# Patient Record
Sex: MALE | Race: WHITE | NOT HISPANIC OR LATINO | Employment: UNEMPLOYED | URBAN - METROPOLITAN AREA
[De-identification: names, ages, dates, MRNs, and addresses within clinical notes are randomized per-mention and may not be internally consistent; named-entity substitution may affect disease eponyms.]

---
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
## 2022-02-02 ENCOUNTER — HOSPITAL ENCOUNTER (EMERGENCY)
Facility: CLINIC | Age: 22
Discharge: HOME | End: 2022-02-02
Attending: EMERGENCY MEDICINE | Admitting: EMERGENCY MEDICINE
Payer: OTHER GOVERNMENT

## 2022-02-02 VITALS
WEIGHT: 123 LBS | HEART RATE: 81 BPM | RESPIRATION RATE: 18 BRPM | OXYGEN SATURATION: 97 % | SYSTOLIC BLOOD PRESSURE: 120 MMHG | DIASTOLIC BLOOD PRESSURE: 57 MMHG | HEIGHT: 69 IN | BODY MASS INDEX: 18.22 KG/M2 | TEMPERATURE: 98.4 F

## 2022-02-02 DIAGNOSIS — F41.9 ANXIETY: ICD-10-CM

## 2022-02-02 DIAGNOSIS — U07.1 INFECTION DUE TO 2019 NOVEL CORONAVIRUS: ICD-10-CM

## 2022-02-02 DIAGNOSIS — F32.A DEPRESSION, UNSPECIFIED DEPRESSION TYPE: ICD-10-CM

## 2022-02-02 LAB — SARS-COV-2 RNA RESP QL NAA+PROBE: POSITIVE

## 2022-02-02 PROCEDURE — 87635 SARS-COV-2 COVID-19 AMP PRB: CPT | Performed by: EMERGENCY MEDICINE

## 2022-02-02 PROCEDURE — 99285 EMERGENCY DEPT VISIT HI MDM: CPT | Mod: 25

## 2022-02-02 PROCEDURE — C9803 HOPD COVID-19 SPEC COLLECT: HCPCS

## 2022-02-02 ASSESSMENT — ENCOUNTER SYMPTOMS
RHINORRHEA: 0
FEVER: 0
NERVOUS/ANXIOUS: 1
COUGH: 0
ABDOMINAL PAIN: 0

## 2022-02-02 ASSESSMENT — MIFFLIN-ST. JEOR: SCORE: 1563.3

## 2022-02-02 NOTE — LETTER
February 2, 2022      To Whom It May Concern:      Timothy Oconnor was seen in our Emergency Department today, 02/02/22.  I expect his condition to improve over the next 3 days.  He may return to work when improved.    Sincerely,        Bull Das MD

## 2022-02-02 NOTE — ED NOTES
Writer called [facility] to inform that pt needs assessment.  Pt resting, denies any needs at this time.  Dinner ordered

## 2022-02-02 NOTE — DISCHARGE INSTRUCTIONS
Aftercare Plan  If I am feeling unsafe or I am in a crisis, I will:   Contact my established care providers   Call the National Suicide Prevention Lifeline: [phone]   Go to the nearest emergency room   Call [phone]     Warning signs that I or other people might notice when a crisis is developing for me: I start to twitch, I start to shake, I can't stop racing thoughts, hitting my head or punching walls    Things I am able to do on my own to cope or help me feel better: go for a walk, take a break, take a shower, listen to music, write in a journal (see coping skills list below)     Things that I am able to do with others to cope or help me better: talk to my girlfriend, spend time with friends and family      Things I can use or do for distraction: watch TV/movies, listen to music, go for a walk, take a shower      Changes I can make to support my mental health and wellness:   - Get a minimum of 30 minutes of self-care daily. This can be as simply as taking a shower, going for a walk, cooking a meal, read, writing, etc.   - Exercise 3-4 times weekly   - Start a gratitude journal and write down 3 things every day that you are grateful for. There are also gratitude journal apps for your smartphone that you can download for free if using your phone.   - Download a meditation pinky and spend 15-20 minutes per day mediating/relaxing. Some apps to download include: Calm, Headspace and Insight Timer. All 3 of these apps have a free version.        People in my life that I can ask for help: my girlfriend, Dad, Mom, friend     Your Critical access hospital has a mental health crisis team you can call 24/7: [facility] Mobile Crisis  [phone] (adults)  [phone] (children), [facility] Crisis Line Number: [phone], [facility] Crisis Line Number: [phone]     Other things that are important when I m in crisis: Remember help is available, follow up with established providers, attend all appointments, take medications as  prescribed        Behavioral Healthcare Providers (BHP) Coordinators to follow up with you with 1-2 days to ensure you have all of the resources that you need. You may also call Behavioral Healthcare Providers (BHP) Coordinators at [phone] if you have any questions or if any additional resources are needed and one of our coordinators can assist you.      Additional resources and information: Please see the appointment information below:    This is a reminder that an appointment for behavioral health services has been scheduled for you. Please   contact your insurance company directly to verify coverage, eligibility, payment, and benefit information.    Date: 2/7/2022  Time: 9:00 AM  Location: [email or web address]  Federico Sampson  [facility] [address]  [place] [address] [phone]    * For directions and/or questions regarding the appointment, please contact the clinic or provider directly   at the phone number listed above.    This is a reminder that an appointment for behavioral health services has been scheduled for you. Please   contact your insurance company directly to verify coverage, eligibility, payment, and benefit information.    Date: 2/8/2022  Time: 11:00 AM  Location: [facility]  Belkis Padron CNP,PMCALLIEP,RN  [address] [address] [phone]    * For directions and/or questions regarding the appointment, please contact the clinic or provider directly   at the phone number listed above.    Progressive Muscle Relaxation Steps:    Find a quiet place free from distractions. Lie on the floor or recline in a chair, loosen any tight clothing, and remove glasses or contacts. Rest your hands in your lap or on the arms of the chair. Take a few slow even breaths. If you have not already, spend a few minutes practicing diaphragmatic breathing.    Now, focus your attention on the following areas, being careful to leave the rest of your body  relaxed.    Forehead: Squeeze the muscles in your forehead, holding for 15 seconds. Feel the muscles becoming tighter and tenser. Then, slowly release the tension in your forehead while counting for 30 seconds. Notice the difference in how your muscles feel as you relax. Continue to release the tension until your forehead feels completely relaxed. Breathe slowly and evenly.    Jaw: Tense the muscles in your jaw, holding for 15 seconds. Then release the tension slowly while counting for 30 seconds. Notice the feeling of relaxation and continue to breathe slowly and evenly.  Neck and shoulders: Increase tension in your neck and shoulders by raising your shoulders up toward your ears and hold for 15 seconds. Slowly release the tension as you count for 30 seconds. Notice the tension melting away.    Arms and hands: Slowly draw both hands into fists. Pull your fists into your chest and hold for 15 seconds, squeezing as tight as you can. Then slowly release while you count for 30 seconds. Notice the feeling of relaxation.    Buttocks: Slowly increase tension in your buttocks over 15 seconds. Then, slowly release the tension over 30 seconds. Notice the tension melting away. Continue to breathe slowly and evenly.  Legs: Slowly increase the tension in your quadriceps and calves over 15 seconds. Squeeze the muscles as hard as you can. Then gently release the tension over 30 seconds. Notice the tension melting away and the feeling of relaxation that is left.    Feet: Slowly increase the tension in your feet and toes. Tighten the muscles as much as you can. Then slowly release the tension while you count for 30 seconds. Notice all the tension melting away. Continue breathing slowly and evenly.    Enjoy the feeling of relaxation sweeping through your body. Continue to breathe slowly and evenly.    Positive Coping Skills: Here's a list of coping skills that will help you when you are feeling strong emotions such as anger,  "anxiety, or depression. These activities are not likely to create more stress or problems, so these help you be more resilient and stress tolerant.    Diversions     Write, draw, paint, photography      Play an instrument, sing, dance, act      Take a shower or a bath      Garden      Take a walk, or go for a drive      Watch television or a movie      Watch cute kitten videos on YouTube      Play a game      Go shopping      Clean or organize your environment      Read      Take a break or vacation     Social/Interpersonal (with others)     Talk to someone you trust      Set boundaries and say \"no\"      Write a note to someone you care about      Be assertive      Use humor      Spend time with friends and/or family      Serve someone in need      Care for or play with a pet      Role-play challenging situations with others      Encourage others     Cognitive (Of the Mind)     Make a gratitude list      Brainstorm solutions      Lower your expectations of the situation      Keep an inspirational quote with you      Be flexible      Write a list of goals      Take a class      Act opposite of negative feelings      Write a list of pros and cons for decisions      Reward or pamper yourself when successful      Write a list of strengths      Accept a challenge with a positive attitude     Tension Releasers     Exercise or play sports      Catharsis (yelling in the bathroom, punching a punching bag)      Cry      Laugh     Physical     Get enough sleep      Eat healthy foods      Get into a good routine      Eat a little chocolate      Limit caffeine      Deep/slow breathing     Spiritual     Waterfall or meditate      Enjoy nature      Get involved in a worthy cause     Limit Setting     Drop some involvement      Prioritize important tasks      Use assertive communication      Schedule time for yourself     Treating Anxiety Disorders with Medicine  An anxiety disorder can make you feel nervous or apprehensive, even " without a clear reason. In people age 65 and older, generalized anxiety disorder is one of the most commonly diagnosed anxiety disorders. Many times it occurs with depression. Certain anxiety disorders can cause intense feelings of fear or panic. You may even have physical symptoms such as a racing heartbeat, sweating, or dizziness. If you have these feelings, you don t have to suffer anymore. Treatment to help you overcome your fears will likely include therapy (also called counseling). Medicine may also be prescribed to help control your symptoms.     Medicines  Certain medicines may be prescribed to help control your symptoms. So you may feel less anxious. You may also feel able to move forward with therapy. At first, medicines and dosages may need to be adjusted to find what works best for you. Try to be patient. Tell your healthcare provider how a medicine makes you feel. This way, you can work together to find the treatment that s best for you. Keep in mind that medicines can have side effects. Talk with your provider about any side effects that are bothering you. Changing the dose or type of medicine may help. Don t stop taking medicine on your own. That can cause symptoms to come back or cause dangerous withdrawal symptoms.     Anti-anxiety medicine. This medicine eases symptoms and helps you relax. Your healthcare provider will explain when and how to use it. It may be prescribed for use before situations that make you anxious. You may also be told to take medicine on a regular schedule. Anti-anxiety medicine may make you feel a little sleepy or  out of it.  Don t drive a car or operate machinery while on this medicine, until you know how it affects you.  Never use alcohol or other drugs with anti-anxiety medicines. This could result in loss of muscular control, sedation, coma, or death. Also, use only the amount of medicine prescribed for you. If you think you may have taken too much, get emergency care  right away. Never share your medicines with others. Store these medicines in a safe place that can't be reached by children or visitors.   Keep taking medicines as prescribed  Never change your dosage, share or use another person's medicine, or stop taking your medicines without talking to your healthcare provider first. Keep the following in mind:     Some medicines must be taken on a schedule. Make this part of your daily routine. For instance, always take your pill before brushing your teeth. A pillbox can help you remember if you ve taken your medicine each day.    Medicines are often taken for 6 to 12 months. Your healthcare provider will then evaluate whether you need to stay on them. Many people who have also had therapy may no longer need medicine to manage anxiety.    You may need to stop taking medicine slowly to give your body time to adjust. When it s time to stop, your healthcare provider will tell you more. Remember: Never stop taking your medicine without talking to your provider first.    If symptoms return, you may need to start taking medicines again.  This isn t your fault. It s just the nature of your anxiety disorder.  What to think about    Side effects. Medicines may cause side effects. Ask your healthcare provider or pharmacist what you can expect. They may have ideas for avoiding some side effects.    Sexual problems. Some antidepressants can affect your desire for sex or your ability to have an orgasm. A change in dosage or medicine often solves the problem. If you have a sexual side effect that concerns you, tell your healthcare provider.    Addiction. If you ve never had a problem with drugs or alcohol, you may not have a problem with medicines used to treat anxiety disorders. But always discuss the medicines with your healthcare provider before taking them. If you have a history of addiction, you may not be able to use certain medicines used to treat anxiety disorders.    Medicine  "interactions. Always check with your pharmacist before using any over-the-counter medicines (OTCs), including herbal supplements. Some OTCs may interact with your anti-anxiety medicines and increase or decrease their effectiveness.     [employer] last reviewed this educational content on 12/1/2019 2000-2021 [employer]. All rights reserved. This information is not intended as a substitute for professional medical care. Always follow your healthcare professional's instructions.  Crisis Lines  Crisis Text Line  Text 343590  You will be connected with a trained live crisis counselor to provide support.    National Hope Line  1.800.SUICIDE [5686439]      Community Resources  Fast Tracker  Linking people to mental health and substance use disorder resources  fasttrackAppUpper - ASOn.org     [place] Mental Health Warm Line  Peer to peer support  Monday thru Saturday, 12 pm to 10 pm  288.211.9570 or 1.561.302.9038  Text \"Support\" to 63305    National [place] on Mental Illness (KM)  575.142.8015 or 1.888.KM.HELPS        Mental Health Apps  My3  https://my3app.org/    VirtualHopeBox  https://[employer].org/apps/virtual-hope-box/    "

## 2022-02-02 NOTE — ED NOTES
[facility]  Expected date:   Expected time:   Means of arrival:   Comments:  Triage - Tonya BUSTILLOS

## 2022-02-02 NOTE — CONSULTS
"2/2/2022  Timothy Oconnor 2000     [facility] Crisis Assessment    Patient was assessed: in person  Patient location: [facility] ED     Referral Data and Chief Complaint  Timothy is a 21 year old who uses he/him pronouns. Patient presented to the ED via police and was referred to the ED by family/friends. Patient is presenting to the ED for the following concerns: patient presents with anxiety and depression. Earlier today, his girlfriend called 911 because he was making statements that \"he was not feeling well\". He was also punching holes in wall, hitting his head against the wall, and was tremulous. This usually happens when he is having racing thoughts. At bedside, he is nervous about what his girlfriend and family think of him because of what happened today. He states that he has not been \"feeling well\" for while. Additionally, he has difficulty with trusting others. He does not currently have a therapist, but is actively looking for one. He recently decided to look for a therapist after having similar episode to what happened today. He also had a family history of anxiety and depression on both sides of the family. He is not currently on any medications. He does not have a history of suicide attempts or self harm. He denies drug and alcohol use.      Informed Consent and Assessment Methods    Patient is his own guardian. Writer met with patient and explained the crisis assessment process, including applicable information disclosures and limits to confidentiality, assessed understanding of the process, and obtained consent to proceed with the assessment. Patient was observed to be able to participate in the assessment as evidenced by verbal consent . Assessment methods included conducting a formal interview with patient, review of medical records, collaboration with medical staff, and obtaining relevant collateral information from family and community providers when available.    Narrative Summary of Presenting " "Problem and Current Functioning  What led to the patient presenting for crisis services, factors that make the crisis life threatening or complex, stressors, how is this disrupting the patient's life, and how current functioning is in comparison to baseline. How is patient presenting during the assessment.     Patient reports today he had been arguing with his girlfriend which significantly increased symptoms of anxiety and some depression.  Patient stated he was having racing thoughts, with sweating, trembling, and increased heart rate.  Patient believes he was having a panic attack, he started to twitch and tremble uncontrollably.  Patient said he became overwhelmed with the racing thoughts so he started hitting his head on the wall \"to get the racing thoughts to stop\".  Per ED notes, the patient was also punching walls.  Patient states he has been experiencing anxiety since middle school, but he has never accessed services for therapy or medications.  Patient said his girlfriend has been worried about him and has been encouraging him to schedule an appointment with a therapist.  Patient identifies triggers preceding crisis were conflicts with his girlfriend. Patient states he is open to accessing services and has looked into appointments, but has not been able to schedule yet.  Patient also notes he has significant trust issues, he states \"I over think everything, I don't even trust my friends, my girlfriend, or my parents, I don't trust anyone\".  Patient also reports appetite changes, he said when his anxiety symptoms increase, his stomach becomes upset and he is not able to eat for 1-2 days.  Patient notes difficulties sleeping, on average he reports he sleeps 3-6 hours per night.  Patient denies SI/HI and SIB.  Patient denies AH/VH, no paranoia or delusions appear to be present.  Patient was calm and cooperative throughout the assessment with a flat affect and sad mood.      History of the Crisis  Duration of " "the current crisis, coping skills attempted to reduce the crisis, community resources used, and past presentations.    Patient reports the anxiety and depression symptoms began in middle school, and have worsened as he has gotten older. He describes anxiety as panic attacks, shaking, trembling, and feeling dizzy with racing thoughts.  He describes depression symptoms as irritability, sadness, and feelings of hopelessness. Patient graduated from [employer] and is now employed and attending college.  Patient attends [employer] and is studying to become a , he explains he likes school and looks forward to his future career.  Patient is also employed part-time at [employer], he does not identify any issues with work.  Patient has no prior inpatient hospitalizations or ED visits.  Patient's parents were  3 years ago, he states this was a particularly difficult time for him.  He reports he rotates staying with his mother, father, and his girlfriend.  Patient has no prior established mental health providers.  Patient is agreeable to scheduling appointments today.      Collateral Information  The following information was received from Travis Oconnor whose relationship to the patient is father. Information was obtained by phone. Their phone number is [phone] and they last had contact with patient on today.    What happened today: [facility] explains he was not there in person earlier today, he was at work and the patient was with his girlfriend.  [facility] is aware of what happened and states \"I've been through this with him many times before, I'm very familiar\".  [facility] reports the patient has episodes of panic attacks and high anxiety, where he will shake, tremble, he's unable to eat, and he will sometimes hit his head on the wall or punch walls.  He also notes the patient has been experiencing anxiety and some depression since he was in middle school.  [facility] said at times the patient will have mood swings, he can be " "very happy and then very irritable and sad.  He said the patient has significant trust issues, \"he has a lot of people around him, but he doesn't trust anyone, not even his friends or his girlfriend\".  Travis gives an example, he said when the patient's girlfriend goes out with friends, he shuts down ad starts to panic.  The patient's girlfriend has broken up with him once before due to these issues.       What is different about patient's functioning: see above     Concern about alcohol/drug use: No    What do you think the patient needs: therapy and medications     Has patient made comments about wanting to kill themselves/others:  No    If d/c is recommended, can they take part in safety/aftercare planning: Yes Travis will pick the patient up upon discharge and the patient will stay at his home tonight.  He will assist with safet and aftercare plans     Risk Assessment    Risk of Harm to Self     ESS-6  1.a. Over the past 2 weeks, have you had thoughts of killing yourself? No  1.b. Have you ever attempted to kill yourself and, if yes, when did this last happen? No   2. Recent or current suicide plan? No   3. Recent or current intent to act on ideation? No  4. Lifetime psychiatric hospitalization? No  5. Pattern of excessive substance use? No  6. Current irritability, agitation, or aggression? Yes  Scoring note: BOTH 1a and 1b must be yes for it to score 1 point, if both are not yes it is zero. All others are 1 point per number. If all questions 1a/1b - 6 are no, risk is negligible. If one of 1a/1b is yes, then risk is mild. If either question 2 or 3, but not both, is yes, then risk is automatically moderate regardless of total score. If both 2 and 3 are yes, risk is automatically high regardless of total score.     Score: 1, mild risk    The patient has the following risk factors for suicide: depressive symptoms, poor impulse control and restless/agitated    Is the patient experiencing current suicidal ideation: " "No    Is the patient engaging in preparatory suicide behaviors (formulating how to act on plan, giving away possessions, saying goodbye, displaying dramatic behavior changes, etc)? No    Does the patient have access to firearms or other lethal means? no    The patient has the following protective factors: displays resiliency , future focused thinking, expresses desire to engage in treatment, sense of obligation to people/pets, engagement in school and fulfilling employment    Support system information: patient identifies his girlfriend, his parents, and one other friend     Patient strengths: patient has identified he is significantly struggling with mental health and is willing to engage in treatment and attend follow up outpatient appointments     Does the patient engage in non-suicidal self-injurious behavior (NSSI/SIB)? Yes- patient endorses hitting his head on walls with the intention of \"making the racing thoughts stop\".      Is the patient vulnerable to sexual exploitation?  No    Is the patient experiencing abuse or neglect? no    Is the patient a vulnerable adult? No      Risk of Harm to Others  The patient has the following risk factors of harm to others: impaired self-control    Does the patient have thoughts of harming others? No    Is the patient engaging in sexually inappropriate behavior?  no       Current Substance Abuse    Is there recent substance abuse? no    Was a urine drug screen or blood alcohol level obtained: No    CAGE AID  Have you felt you ought to cut down on your drinking or drug use?  No  Have people annoyed you by criticizing your drinking or drug use? No  Have you felt bad or guilty about your drinking or drug use? No  Have you ever had a drink or used drugs first thing in the morning to steady your nerves or to get rid of a hangover? No  Score: 0/4       Current Symptoms/Concerns    Symptoms  Attention, hyperactivity, and impulsivity symptoms present: No    Anxiety symptoms " present: Yes: Panic attacks and Generalized Symptoms: Agitation, Cognitive anxiety - feelings of doom, racing thoughts, difficulty concentrating , Excessive worry, Physiological anxiety - sweating, flushing, shaking, shortness of breath, or racing heart and Somatic symptoms - abdominal pain, headache, or tension      Appetite symptoms present: Yes: Loss of Appetite     Behavioral difficulties present: No     Cognitive impairment symptoms present: No    Depressive symptoms present: Yes Crying or feels like crying, Depressed mood, Feelings of hopelessness , Sleep disturbance  and Somatic complaints     Eating disorder symptoms present: No    Learning disabilities, cognitive challenges, and/or developmental disorder symptoms present: No     Manic/hypomanic symptoms present: No    Personality and interpersonal functioning difficulties present : Yes: Emotional Deregulation and Impaired Interpersonal Functioning    Psychosis symptoms present: No      Sleep difficulties present: Yes: Difficulty falling asleep  and Difficulty staying sleep     Substance abuse disorder symptoms present: No     Trauma and stressor related symptoms present: No           Mental Status Exam   Affect: Flat   Appearance: Appropriate    Attention Span/Concentration: Attentive?    Eye Contact: Variable   Fund of Knowledge: Appropriate    Language /Speech Content: Fluent   Language /Speech Volume: Normal    Language /Speech Rate/Productions: Normal    Recent Memory: Variable   Remote Memory: Intact   Mood: Anxious and Sad    Orientation to Person: Yes    Orientation to Place: Yes   Orientation to Time of Day: Yes    Orientation to Date: Yes    Situation (Do they understand why they are here?): Yes    Psychomotor Behavior: Normal    Thought Content: Clear   Thought Form: Intact       Mental Health and Substance Abuse History    History  Current and historical diagnoses or mental health concerns: patient reports anxiety and depression since middle  school    Prior MH services (inpatient, programmatic care, outpatient, etc) : No    History of substance abuse: No    Prior JACQUI services (inpatient, programmatic care, detox, outpatient, etc) : No    History of commitment: No    Family history of MH/JACQUI: Yes anxiety and depression on both maternal and paterrnal sides     Trauma history: Yes patient describes his parent's divorce 3 years ago as a significantly difficult time     Medication  Psychotropic medications: No    Current Care Team  Primary Care Provider: Loan Christopher     Psychiatrist: No    Therapist: No    : No    CTSS or ARMHS: No    ACT Team: No    Other: No    Release of Information  Was a release of information signed: No. Reason: no established mental health providers      Biopsychosocial Information    Socioeconomic Information  Current living situation: patient rotates living with his mother, father, and girlfriend     Employment/income source: employed part-time at [employer], full-time student at [employer]     Relevant legal issues: none     Cultural, Baptist, or spiritual influences on mental health care: none     Is the patient active in the  or a : No      Relevant Medical Concerns   Patient identifies concerns with completing ADLs? Yes     Patient can ambulate independently? Yes     Other medical concerns? No     History of concussion or TBI? No        Diagnosis    300.00 (F41.9) Unspecified Anxiety Disorder - by history     311 (F32.9) Unspecified Depressive Disorder  - by history         Therapeutic Intervention  The following therapeutic methodologies were employed when working with the patient: establishing rapport, active listening, assessing dimensions of crisis, solution focused brief therapy, establishing a discharge plan, safety planning and motivational interviewing. Patient response to intervention: patient was calm and cooperative.      Disposition  Recommended disposition: Individual  Therapy and Medication Management      Reviewed case and recommendations with attending provider. Attending Name: Dr. Bull Das       Attending concurs with disposition: Yes      Patient concurs with disposition: Yes      Guardian concurs with disposition: NA     Final disposition: Individual therapy  and Medication management.     Inpatient Details (if applicable):  Is patient admitted voluntarily:Yes    Patient aware of potential for transfer if there is not appropriate placement? NA     Patient is willing to travel outside of the [facility] for placement? NA      Behavioral Intake Notified? NA       Clinical Substantiation of Recommendations   Rationale with supporting factors for disposition and diagnosis.     Patient denies SI/HI and SIB.  Patient denies having access to guns or weapons, patient does not appear to have symptoms of psychosis.  Patient is agreeable to follow up with outpatient services, please see scheduled therapy and medication management appointments below.  Patient participated in safety and aftercare planning.        Assessment Details  Patient interview started at: 12:15pm and completed at: 1:20pm.    Total duration spent on the patient case in minutes: 1.0 hrs     CPT code(s) utilized: 94490 - Psychotherapy for Crisis - 60 (30-74*) min       Aftercare and Safety Planning  Follow up plans with MH/JACQUI services: Yes please see scheduled appointments below       Aftercare plan placed in the AVS and provided to patient: Yes. Given to patient by Colette Javier. [facility]     LYDIA Jang    Aftercare Plan  If I am feeling unsafe or I am in a crisis, I will:   Contact my established care providers   Call the National Suicide Prevention Lifeline: [phone]   Go to the nearest emergency room   Call [phone]      Warning signs that I or other people might notice when a crisis is developing for me: I start to twitch, I start to shake, I can't stop racing thoughts, hitting my head or punching  walls     Things I am able to do on my own to cope or help me feel better: go for a walk, take a break, take a shower, listen to music, write in a journal (see coping skills list below)      Things that I am able to do with others to cope or help me better: talk to my girlfriend, spend time with friends and family       Things I can use or do for distraction: watch TV/movies, listen to music, go for a walk, take a shower       Changes I can make to support my mental health and wellness:   - Get a minimum of 30 minutes of self-care daily. This can be as simply as taking a shower, going for a walk, cooking a meal, read, writing, etc.   - Exercise 3-4 times weekly   - Start a gratitude journal and write down 3 things every day that you are grateful for. There are also gratitude journal apps for your smartphone that you can download for free if using your phone.   - Download a meditation pinky and spend 15-20 minutes per day mediating/relaxing. Some apps to download include: Calm, Headspace and Insight Timer. All 3 of these apps have a free version.         People in my life that I can ask for help: my girlfriend, Dad, Mom, friend      Your [facility] has a mental health crisis team you can call 24/7: [facility] Mobile Crisis  [phone] (adults)  [phone] (children), [facility] Crisis Line Number: [phone], [facility] Crisis Line Number: [phone]     Other things that are important when I m in crisis: Remember help is available, follow up with established providers, attend all appointments, take medications as prescribed         Behavioral Healthcare Providers (BHP) Coordinators to follow up with you with 1-2 days to ensure you have all of the resources that you need. You may also call Behavioral Healthcare Providers (BHP) Coordinators at [phone] if you have any questions or if any additional resources are needed and one of our coordinators can assist you.       Additional resources and  information: Please see the appointment information below:     This is a reminder that an appointment for behavioral health services has been scheduled for you. Please   contact your insurance company directly to verify coverage, eligibility, payment, and benefit information.     Date: 2/7/2022  Time: 9:00 AM  Location: [facility]  Federico Sampson  [address]  [place] [address]  [phone]     * For directions and/or questions regarding the appointment, please contact the clinic or provider directly   at the phone number listed above.     This is a reminder that an appointment for behavioral health services has been scheduled for you. Please   contact your insurance company directly to verify coverage, eligibility, payment, and benefit information.     Date: 2/8/2022  Time: 11:00 AM  Location: [facility]  Belkis Padron CNP,PMCALLIEP,RN  [address] [address]  [phone]     * For directions and/or questions regarding the appointment, please contact the clinic or provider directly   at the phone number listed above.     Progressive Muscle Relaxation Steps:     Find a quiet place free from distractions. Lie on the floor or recline in a chair, loosen any tight clothing, and remove glasses or contacts. Rest your hands in your lap or on the arms of the chair. Take a few slow even breaths. If you have not already, spend a few minutes practicing diaphragmatic breathing.     Now, focus your attention on the following areas, being careful to leave the rest of your body relaxed.     Forehead: Squeeze the muscles in your forehead, holding for 15 seconds. Feel the muscles becoming tighter and tenser. Then, slowly release the tension in your forehead while counting for 30 seconds. Notice the difference in how your muscles feel as you relax. Continue to release the tension until your forehead feels completely relaxed. Breathe slowly and evenly.     Jaw:  Tense the muscles in your jaw, holding for 15 seconds. Then release the tension slowly while counting for 30 seconds. Notice the feeling of relaxation and continue to breathe slowly and evenly.  Neck and shoulders: Increase tension in your neck and shoulders by raising your shoulders up toward your ears and hold for 15 seconds. Slowly release the tension as you count for 30 seconds. Notice the tension melting away.     Arms and hands: Slowly draw both hands into fists. Pull your fists into your chest and hold for 15 seconds, squeezing as tight as you can. Then slowly release while you count for 30 seconds. Notice the feeling of relaxation.     Buttocks: Slowly increase tension in your buttocks over 15 seconds. Then, slowly release the tension over 30 seconds. Notice the tension melting away. Continue to breathe slowly and evenly.  Legs: Slowly increase the tension in your quadriceps and calves over 15 seconds. Squeeze the muscles as hard as you can. Then gently release the tension over 30 seconds. Notice the tension melting away and the feeling of relaxation that is left.     Feet: Slowly increase the tension in your feet and toes. Tighten the muscles as much as you can. Then slowly release the tension while you count for 30 seconds. Notice all the tension melting away. Continue breathing slowly and evenly.     Enjoy the feeling of relaxation sweeping through your body. Continue to breathe slowly and evenly.     Positive Coping Skills: Here's a list of coping skills that will help you when you are feeling strong emotions such as anger, anxiety, or depression. These activities are not likely to create more stress or problems, so these help you be more resilient and stress tolerant.     Diversions      Write, draw, paint, photography       Play an instrument, sing, dance, act       Take a shower or a bath       Garden       Take a walk, or go for a drive       Watch television or a movie       Watch cute [employer]  "on YouTube       Play a game       Go shopping       Clean or organize your environment       Read       Take a break or vacation      Social/Interpersonal (with others)      Talk to someone you trust       Set boundaries and say \"no\"       Write a note to someone you care about       Be assertive       Use humor       Spend time with friends and/or family       Serve someone in need       Care for or play with a pet       Role-play challenging situations with others       Encourage others      Cognitive (Of the Mind)      Make a gratitude list       Brainstorm solutions       Lower your expectations of the situation       Keep an inspirational quote with you       Be flexible       Write a list of goals       Take a class       Act opposite of negative feelings       Write a list of pros and cons for decisions       Reward or pamper yourself when successful       Write a list of strengths       Accept a challenge with a positive attitude      Tension Releasers      Exercise or play sports       Catharsis (yelling in the bathroom, punching a punching bag)       Cry       Laugh      Physical      Get enough sleep       Eat healthy foods       Get into a good routine       Eat a little chocolate       Limit caffeine       Deep/slow breathing      Spiritual      [place] or meditate       Enjoy nature       Get involved in a worthy cause      Limit Setting      Drop some involvement       Prioritize important tasks       Use assertive communication       Schedule time for yourself      Treating Anxiety Disorders with Medicine  An anxiety disorder can make you feel nervous or apprehensive, even without a clear reason. In people age 65 and older, generalized anxiety disorder is one of the most commonly diagnosed anxiety disorders. Many times it occurs with depression. Certain anxiety disorders can cause intense feelings of fear or panic. You may even have physical symptoms such as a racing heartbeat, sweating, or dizziness. " If you have these feelings, you don t have to suffer anymore. Treatment to help you overcome your fears will likely include therapy (also called counseling). Medicine may also be prescribed to help control your symptoms.     Medicines  Certain medicines may be prescribed to help control your symptoms. So you may feel less anxious. You may also feel able to move forward with therapy. At first, medicines and dosages may need to be adjusted to find what works best for you. Try to be patient. Tell your healthcare provider how a medicine makes you feel. This way, you can work together to find the treatment that s best for you. Keep in mind that medicines can have side effects. Talk with your provider about any side effects that are bothering you. Changing the dose or type of medicine may help. Don t stop taking medicine on your own. That can cause symptoms to come back or cause dangerous withdrawal symptoms.     Anti-anxiety medicine. This medicine eases symptoms and helps you relax. Your healthcare provider will explain when and how to use it. It may be prescribed for use before situations that make you anxious. You may also be told to take medicine on a regular schedule. Anti-anxiety medicine may make you feel a little sleepy or  out of it.  Don t drive a car or operate machinery while on this medicine, until you know how it affects you.  Never use alcohol or other drugs with anti-anxiety medicines. This could result in loss of muscular control, sedation, coma, or death. Also, use only the amount of medicine prescribed for you. If you think you may have taken too much, get emergency care right away. Never share your medicines with others. Store these medicines in a safe place that can't be reached by children or visitors.   Keep taking medicines as prescribed  Never change your dosage, share or use another person's medicine, or stop taking your medicines without talking to your healthcare provider first. Keep the  following in mind:     Some medicines must be taken on a schedule. Make this part of your daily routine. For instance, always take your pill before brushing your teeth. A pillbox can help you remember if you ve taken your medicine each day.    Medicines are often taken for 6 to 12 months. Your healthcare provider will then evaluate whether you need to stay on them. Many people who have also had therapy may no longer need medicine to manage anxiety.    You may need to stop taking medicine slowly to give your body time to adjust. When it s time to stop, your healthcare provider will tell you more. Remember: Never stop taking your medicine without talking to your provider first.    If symptoms return, you may need to start taking medicines again.  This isn t your fault. It s just the nature of your anxiety disorder.  What to think about    Side effects. Medicines may cause side effects. Ask your healthcare provider or pharmacist what you can expect. They may have ideas for avoiding some side effects.    Sexual problems. Some antidepressants can affect your desire for sex or your ability to have an orgasm. A change in dosage or medicine often solves the problem. If you have a sexual side effect that concerns you, tell your healthcare provider.    Addiction. If you ve never had a problem with drugs or alcohol, you may not have a problem with medicines used to treat anxiety disorders. But always discuss the medicines with your healthcare provider before taking them. If you have a history of addiction, you may not be able to use certain medicines used to treat anxiety disorders.    Medicine interactions. Always check with your pharmacist before using any over-the-counter medicines (OTCs), including herbal supplements. Some OTCs may interact with your anti-anxiety medicines and increase or decrease their effectiveness.     [employer] last reviewed this educational content on 12/1/2019 2000-2021 [employer].  "All rights reserved. This information is not intended as a substitute for professional medical care. Always follow your healthcare professional's instructions.  Crisis Lines  Crisis Text Line  Text 565601  You will be connected with a trained live crisis counselor to provide support.     National Hope Line  1.800.SUICIDE [7076108]        Community Resources  Fast Tracker  Linking people to mental health and substance use disorder resources  fastTransEnterixn.org      [place] [facility] Warm Line  Peer to peer support  Monday thru Saturday, 12 pm to 10 pm  223.117.3731 or 6.373.028.0898  Text \"Support\" to 44490     National [place] on Mental Illness (KM)  528.920.0196 or 1.888.KM.HELPS           Mental Health Apps  My3  https://myEnhanCVpp.org/     VirtualHopeBox  https://aroundtheway.org/apps/virtual-hope-box/        "

## 2022-02-02 NOTE — ED PROVIDER NOTES
"  History   Chief Complaint:  Anxiety and Depression       The history is provided by the patient.      Timothy Oconnor is a 21 year old male who presents with anxiety and depression. Earlier today, his girlfriend called 911 because he was statements that \"he was not feeling well\". He was also punching holes in wall, hitting his head against the wall, and was tremulous. This usually happens when he is having racing thoughts. At bedsides, he is nervous about what his girlfriend and family think of him because of what happened today. He states that he has not been \"feeling well\" for while. Additionally, he has difficulty with trusting others. He does not currently have a therapist, but is actively looking for one. He recently decided to look for a therapist after having similar episode to what happened today. He also had a family history of anxiety and depression on both sides of the family. He is not currently on any medications. He does not have a history of suicide attempts or self harm. He denies drugs, alcohol, or tobacco use. He endorses occasional e-cigarette use. Besides BB guns, he does not have access to live firearms at home. He denies suicidal ideation, homicidal ideation, cough, fever, rhinorrhea, fever, or abdominal pain. He currently works in the [employer] at [employer].     Review of Systems   Constitutional: Negative for fever.   HENT: Negative for rhinorrhea.    Respiratory: Negative for cough.    Gastrointestinal: Negative for abdominal pain.   Psychiatric/Behavioral: Positive for self-injury. Negative for suicidal ideas. The patient is nervous/anxious.    All other systems reviewed and are negative.    Allergies:  No Known Allergies    Medications:  Patient denies current use of medications.     Past Medical History:     Patient denies pertinent past medical history.    Past Surgical History:    The patient denies past surgical history.      Family History:    The patient denies past family history. " "    Social History:  Patient presents to the ED alone via EMS  Patient currently works in the [employer] at [employer].     Physical Exam     Patient Vitals for the past 24 hrs:   BP Temp Temp src Pulse Resp SpO2 Height Weight   22 0915 (!) 121/92 98.4  F (36.9  C) Oral 90 16 94 % 1.753 m (5' 9\") 55.8 kg (123 lb)       Physical Exam  Constitutional: Well developed, nontox appearance  Head: Atraumatic.   Neck:  no stridor  Eyes: no scleral icterus  Cardiovascular: RRR, 2+ R radial pulse  Pulmonary/Chest: nml resp effort  Ext: Warm, well perfused, no edema  Neurological: A&O, symmetric facies, moves ext x4  Skin: Skin is warm and dry.   Psychiatric: Denies SI/HI. Avoidant gaze and somewhat flattened affect. Does not appear to be responding to internal stimuli;  Mild psychomotor agitation  Nursing note and vitals reviewed.    Emergency Department Course   Laboratory:  Labs Ordered and Resulted from Time of ED Arrival to Time of ED Departure   COVID-19 VIRUS (CORONAVIRUS) BY PCR - Abnormal       Result Value    SARS CoV2 PCR Positive (*)         Emergency Department Course:    Reviewed:  I reviewed nursing notes, vitals, past medical history and Care Everywhere    Assessments:  919 I obtained history and examined the patient as noted above.     Disposition:  The patient was discharged to home.     Impression & Plan   Medical Decision Makin year old male presenting w/ feelings of depression and anxiety    DDx includes psychiatric disorder NOS, personality disorder NOS, PAUL, MDD. Given the patient's history and symptomatology, I think it would be [facility] if his Covid test returns negative. Patient's Covid test returned positive.  DEC evaluated the patient in the emergency department and recommended outpatient follow-up with a psychiatrist and therapist. I think this is reasonable plan and the patient is comfortable with this.  At this time I feel the pt is safe for discharge.  " Recommendations given regarding follow up with psychiatry and therapy and return to the emergency department as needed for new or worsening symptoms.  Pt counseled on all results, disposition and diagnosis.  They are understanding and agreeable to plan. Patient discharged in stable condition.       Diagnosis:    ICD-10-CM    1. Depression, unspecified depression type  F32.A    2. Anxiety  F41.9    3. Infection due to 2019 novel coronavirus  U07.1        Scribe Disclosure:  I, Joshua Elise, am serving as a scribe at 9:31 AM on 2/2/2022 to document services personally performed by Bull Das MD based on my observations and the provider's statements to me.        Bull Das MD  02/02/22 1501       Bull Das MD  02/02/22 1506

## 2022-02-02 NOTE — ED NOTES
Patient was brought in by PD, patient states that he was not feeling well. He was changed into scrubs and belongings placed in locker.

## 2022-02-02 NOTE — ED NOTES
Brought into ED by Tonya BUSTILLOS voluntarily for evaluation of suicidal ideation. Per PD, pt sending threatening text messages. Father concerned and sent PD over to home.